# Patient Record
Sex: MALE | Race: WHITE
[De-identification: names, ages, dates, MRNs, and addresses within clinical notes are randomized per-mention and may not be internally consistent; named-entity substitution may affect disease eponyms.]

---

## 2020-08-07 ENCOUNTER — HOSPITAL ENCOUNTER (OUTPATIENT)
Dept: HOSPITAL 74 - JASU-SURG | Age: 50
Discharge: HOME | End: 2020-08-07
Attending: ORTHOPAEDIC SURGERY
Payer: COMMERCIAL

## 2020-08-07 VITALS — HEART RATE: 84 BPM | DIASTOLIC BLOOD PRESSURE: 96 MMHG | TEMPERATURE: 98 F | SYSTOLIC BLOOD PRESSURE: 145 MMHG

## 2020-08-07 VITALS — BODY MASS INDEX: 29 KG/M2

## 2020-08-07 DIAGNOSIS — M65.9: ICD-10-CM

## 2020-08-07 DIAGNOSIS — S83.242A: Primary | ICD-10-CM
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
## 2020-08-07 DIAGNOSIS — M22.42: ICD-10-CM

## 2020-08-07 PROCEDURE — 29881 ARTHRS KNE SRG MNISECTMY M/L: CPT

## 2020-08-07 PROCEDURE — 0SBD4ZZ EXCISION OF LEFT KNEE JOINT, PERCUTANEOUS ENDOSCOPIC APPROACH: ICD-10-PCS | Performed by: ORTHOPAEDIC SURGERY

## 2020-08-07 PROCEDURE — 0SQD4ZZ REPAIR LEFT KNEE JOINT, PERCUTANEOUS ENDOSCOPIC APPROACH: ICD-10-PCS | Performed by: ORTHOPAEDIC SURGERY

## 2020-08-07 NOTE — OPR
Procedure: Left knee-


1.  Diagnostic arthroscopy


2.  Arthroscopic partial medial meniscectomy


3.  Arthroscopic knee synovectomy, limited


4.  Chondroplasty of the patella, trochlea, and medial femoral condyle





Preoperative Diagnoses: Left knee-


1.  Medial meniscus tear


2.  Patellar chondromalacia


3.  Synovitis





Postoperative Diagnoses: Left knee-


1.  Medial meniscus tear


2.  Grade II and III chondromalacia patella


3.  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 


4.  Grade II and III chondromalacia medial femoral condyle, 6 x 6 mm


5.  Synovitis and medial plica





Surgeon: Darío Patrick DO





Assistants: Sea Ross DO





Anesthesia: General anesthesia





Estimated Blood Loss:  Minimal


       


Drains: None


       


Total IV Fluids: Per anesthesia record


       


Specimens: None


       


Implants: None


       


Complications:  None


       


Disposition: PACU


       


Condition: Hemodynamically stable





Tourniquet time: Not inflated





Indications: Ralph Griffin presented to us with chronic left knee pain that 

failed conservative measures. His symptoms, signs, and imaging were consistent 

with the above noted diagnoses. He ultimately elected to proceed with surgical 

intervention after discussion of the risks, benefits, alternatives. We discussed

risks including but not limited to, bleeding, pain, infection, scarring, damage 

to neurovascular structures, blood clots, pulmonary embolus, need for additional

surgery, incomplete relief of pain, and incomplete return of function. He 

expressed understanding and wished to proceed.


 


He underwent preoperative medical evaluation clearance and optimization prior to

surgery.





Procedure Details: 


On the day of surgery, the patient was seen and identified in the preoperative 

holding area, and the operative site was confirmed with the patient and marked. 

The risks and benefits of surgery were again reviewed with the patient, and he 

elected to proceed.





The patient was brought back to the operating room and positioned on the 

operating room table in a supine position, with care to pad all bony prominences

and in neutral alignment. SCDs were placed over the contralateral extremity. A 

nonsterile tourniquet was placed over the proximal thigh, and the left knee was 

then prepped and draped in the usual sterile fashion.





A timeout was performed, confirming the correct operative site, as well as the 

administration of IV antibiotics and the availability of all necessary personnel

and equipment.





Examination under anesthesia showed full range of motion, stable to AP, varus 

and valgus stresses. 





10cc Of 0.25% marcaine were used to infiltrate the portal sites, and through a 

lateral portal, a diagnostic arthroscopy ensued.





This showed mild to moderate inflammed synovitis. The patella was centered in 

the trochlear groove. There was grade II and III chondromalacia of the patella 

and a grade II and III chondromalacia of the central trochlea measuring 3 x 6 

mm. The trochlea showed no significant dysplasia. The medial compartment showed 

a Grade II and III chondromalacia on the medial femoral condyle measuring 6 x 6 

mm. There were no chondral defects on the medial tibial plateau. The medial 

meniscus was probed an found to have radial tear at the posterior horn-body 

junction, which was partially resected and debrided to a stable base.  A 

chondroplasty was performed with a shaver at the medial femoral condyle, 

patella, and trochlea until there was a stable base. A large medial plica was 

noted, causing impingement in the patellofemoral joint, and a two compartment 

synovectomy was performed at this time. 





The ACL and PCL were probed and found to be intact.





The lateral femoral condyle and lateral tibial plateau showed no chondral 

defects. The lateral meniscus was probed and found to be stable. 





The medial and lateral gutters showed no loose bodies.





All instruments with and removed, and the knee was thoroughly irrigated.





Portals were closed using 40 Nylon and dressed with 4x4s, ABDs, webril and a 

loosely wrapped ace bandage.





The patient was awakened from anesthesia and transferred to the PACU in stable 

condition. There were no complications.





Attestation for first assistant: Dr. Sea Ross acted as the first assistant. 

There was no qualified resident or physician assistant available to do so.





Postoperative plan: The patient will be weight bearing as tolerated on crutches 

for 3 days. Physical therapy to begin in 2 weeks.

## 2020-08-11 NOTE — PATH
Surgical Pathology Report



Patient Name:  PATRICIA WATSON

Accession #:  [id number]

Med. Rec. #:  [id number]                                                        

   /Age/Gender:  1970 (Age: 50) / M

Account:  [id number]                                                          

             Location[place] [facility] [place] [facility]

Taken:  2020

Received:  8/10/2020

Reported:  2020

Physicians:  Darío Patrick DO

  



Specimen(s) Received

 SHAVINGS 





Clinical History

Left knee medial meniscus tear







Final Diagnosis

KNEE SHAVINGS, LEFT, ARTHROSCOPY:

FRAGMENTS OF SCANT FIBROCONNECTIVE TISSUE, ADIPOSE TISSUE, AND SYNOVIUM.





***Electronically Signed***

Addie Beaulieu M.D.





Gross Description

Received in formalin labeled "left knee shavings," is a 2.5 x 1.3 x 0.3 cm

aggregate of tan-yellow soft tissue fragments. The formalin is filtered and the

specimen is entirely submitted in one cassette.

/8/10/2020



saudi/8/10/2020

## 2020-10-09 NOTE — XMS
Summarization Of Episode

                           Created on:2020



Patient:PATRICIA WATSON

Sex:Male

:1970

External Reference #:[id number]





Demographics







                          Address                   [address]



                                                    [place] [address]

 

                          Home Phone                [phone]

 

                          Work Phone                [phone]

 

                          Preferred Language        en

 

                          Marital Status            Not  or 

 

                          Gnosticism Affiliation     [place]

 

                          Race                      [facility]

 

                          Ethnic Group              Not  or 









Author







                          Organization              [facility]









Support







                Name            Relationship    Address         Phone

 

                MANDARIN ORIENTAL Unavailable     [address] [phone]



                                                [place] [address] 

 

                LIAM WATSON  UNCLE           [address] [phone]



                                                [place] [address] 

 

                NA              Unavailable     Unavailable     Unavailable

 

                TIFFANIE WATSON    SP              [address] [phone]

[phone]



                                                [place] [address] 









Care Team Providers







                    Name                Role                Phone

 

                    RUBENS OSORIO NHDEVON IRELAND Unavailable         Unavailable

 

                    ERMIAS, IVORY DO     Unavailable         Unavailable

 

                    ERMIAS, IVORY DO     Unavailable         Unavailable

 

                    ERMIAS, IVORY DO     Unavailable         Unavailable

 

                    ERMIAS, IVORY DO     Unavailable         Unavailable









Re-disclosure Warning

The records that you are about to access may contain information from federally-
assisted alcohol or drug abuse programs. If such information is present, then 
the following federally mandated warning applies: This information has been 
disclosed to you from records protected by federal confidentiality rules (42 CFR
part 2). The federal rules prohibit you from making any further disclosure of 
this information unless further disclosure is expressly permitted by the written
consent of the person to whom it pertains or as otherwise permitted by 42 CFR 
part 2. A general authorization for the release of medical or other information 
is NOT sufficient for this purpose. The Federal rules restrict any use of the 
information to criminally investigate or prosecute any alcohol or drug abuse 
patient.The records that you are about to access may contain highly sensitive 
health information, the redisclosure of which is protected by Article 27-F of 
the [place] Public Health law. If you continue you may haveaccess to 
information: Regarding HIV / AIDS; Provided by facilities licensed or operated 
by the [facility] Office of Mental Health; or Provided by the [facility]
Office for People With Developmental Disabilities. If such information is 
present, then the following [place] mandated warning applies: This 
information has been disclosed to you from confidential records which are 
protected by state law. State law prohibits you from making any further 
disclosure of this information without the specific written consent of the 
person to whom it pertains, or as otherwise permitted by law. Any unauthorized 
further disclosure in violation of state law may result in a fine or detention 
sentence or both. A general authorization for the release of medical or other 
information is NOT sufficient authorization for further disclosure.



Encounters







           Encounter  Providers  Location   Date       Indications Data Source(s

)

 

           Outpatient Attender: IVORY PUTNAM          2020            [facility]



                      ERMIAS DOAdmitter:            11:55:00 AM            Medic

al Center



                      [facility]            EDT                   



                      DOReferrer: IVORY                                  



                      ERMIAS DO                                   

 

                      Attender: IVORY            2020            [facility]

[facility]



                      ERMIAS DO             11:55:00 AM            [facility]

al



                                            EDT -                 Center)



                                            2020            



                                            11:55:00 AM            



                                            EDT                   

 

           Outpatient Attender: DEVON PUTNAM          2019            [facility]

[facility]



                      RUBENS OSORIO            01:34:00 PM            Medical Cent

er



                      NHUTAdmitter: DEVON OSORIO                                  



                      NHUTReferrer: DEVON OSORIO NHUT                                  







Insurance Providers







          Payer name Policy type Policy ID Covered   Covered party's Policy    P

inés



                    / Coverage           party ID  relationship to Saldana    Inf

ormation



                    type                          saldana              

 

          [employer] HMO           [id number]           SP                  [id number]

5

 

          [employer] [facility]           [id number]             self                [id number]

 

                                                                      

 

                    O                                                 

 

          [employer] [facility] O         [id number]           01                  [id number]

05







Problems, Conditions, and Diagnoses







           Code       Display Name Description Problem Type Effective Dates Data

 Source(s)

 

           M79.603    Pain in arm, PAIN IN ARM, Diagnosis  2020 [facility]

[facility]



                      unspecified UNSPECIFIED            11:55:00 AM EDT Medical

 Center

 

           M25.569    Pain in    PAIN IN    Diagnosis  2020 [facility]



                      unspecified knee UNSPECIFIED KNEE            11:55:00 AM E

[facility]

 

           M79.89     Other specified OTHER SPECIFIED Diagnosis  2019 [facility]



                      soft tissue SOFT TISSUE            01:34:00 PM [facility]



                      disorders  DISORDERS                        







Results







                    ID                  Date                Data Source

 

                    [id number]         2020 10:10:00 AM EDT [facility]











          Name      Value     Range     Interpretation Description Data      Sup

porting



                                        Code                Source(s) Document(s

)

 

          SARS                                              [facility]   



          coronavirus 2                                                   



          RNA                                                         









                                        This lab was ordered by NILA ALEJANDRARH and reported by [facility].











                    ID                  Date                Data Source

 

                    [id number]        2020 02:04:00 PM EDT [facility]











          Name      Value     Range     Interpretation Code Description Data Francisca

rce(s) Supporting



                                                                      Document(s

)

 

          SARS-CoV-2                                         [facility] 



          RNA [facility] 



          Ql                                                          



          TIANNA+probe                                                   









                                        This lab was ordered by [facility] a

nd reported by [facility].









                                        Procedure

## 2020-10-16 ENCOUNTER — HOSPITAL ENCOUNTER (OUTPATIENT)
Dept: HOSPITAL 74 - FASU | Age: 50
Discharge: HOME | End: 2020-10-16
Attending: ORTHOPAEDIC SURGERY
Payer: COMMERCIAL

## 2020-10-16 VITALS — TEMPERATURE: 97.8 F | HEART RATE: 65 BPM

## 2020-10-16 VITALS — SYSTOLIC BLOOD PRESSURE: 131 MMHG | DIASTOLIC BLOOD PRESSURE: 68 MMHG

## 2020-10-16 VITALS — BODY MASS INDEX: 29 KG/M2
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
## 2020-10-16 DIAGNOSIS — M94.261: ICD-10-CM

## 2020-10-16 DIAGNOSIS — M65.861: ICD-10-CM
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
## 2020-10-16 DIAGNOSIS — S83.241A: Primary | ICD-10-CM

## 2020-10-16 DIAGNOSIS — M65.161: ICD-10-CM

## 2020-10-16 PROCEDURE — 0SBC4ZZ EXCISION OF RIGHT KNEE JOINT, PERCUTANEOUS ENDOSCOPIC APPROACH: ICD-10-PCS | Performed by: ORTHOPAEDIC SURGERY

## 2020-10-16 PROCEDURE — 29876 ARTHRS KNEE SURG SYNVCT MAJ: CPT

## 2020-10-16 PROCEDURE — 29881 ARTHRS KNE SRG MNISECTMY M/L: CPT

## 2020-10-16 NOTE — XMS
Summarization Of Episode

                           Created on:2020



Patient:PATRICIA WATSON

Sex:Male

:1970

External Reference #:[id number]





Demographics







                          Address                   [address]



                                                    [place] [address]

 

                          Home Phone                [phone]

 

                          Work Phone                [phone]

 

                          Preferred Language        en

 

                          Marital Status            Not  or 

 

                          Judaism Affiliation     [place]

 

                          Race                      [facility]

 

                          Ethnic Group              Not  or 









Author







                          Organization              [facility]









Support







                Name            Relationship    Address         Phone

 

                MANDARIN ORIENTAL Unavailable     [address] [phone]



                                                [place] [address] 

 

                LIAM WATSON  UNCLE           [address] [phone]



                                                [place] [address] 

 

                NA              Unavailable     Unavailable     Unavailable

 

                TIFFANIE WATSON    SP              [address] [phone]

[phone]



                                                [place] [address] 









Care Team Providers







                    Name                Role                Phone

 

                    RUBENS OSORIO NHDEVON IRELAND Unavailable         Unavailable

 

                    ERMIAS, IVORY DO     Unavailable         Unavailable

 

                    ERMIAS, IVORY DO     Unavailable         Unavailable

 

                    ERMIAS, IVORY DO     Unavailable         Unavailable

 

                    ERMIAS, IVORY DO     Unavailable         Unavailable









Re-disclosure Warning

The records that you are about to access may contain information from federally-
assisted alcohol or drug abuse programs. If such information is present, then 
the following federally mandated warning applies: This information has been 
disclosed to you from records protected by federal confidentiality rules (42 CFR
part 2). The federal rules prohibit you from making any further disclosure of 
this information unless further disclosure is expressly permitted by the written
consent of the person to whom it pertains or as otherwise permitted by 42 CFR 
part 2. A general authorization for the release of medical or other information 
is NOT sufficient for this purpose. The Federal rules restrict any use of the 
information to criminally investigate or prosecute any alcohol or drug abuse 
patient.The records that you are about to access may contain highly sensitive 
health information, the redisclosure of which is protected by Article 27-F of 
the [place] Public Health law. If you continue you may haveaccess to 
information: Regarding HIV / AIDS; Provided by facilities licensed or operated 
by the [facility] Office of Mental Health; or Provided by the [facility]
Office for People With Developmental Disabilities. If such information is 
present, then the following [place] mandated warning applies: This 
information has been disclosed to you from confidential records which are 
protected by state law. State law prohibits you from making any further 
disclosure of this information without the specific written consent of the 
person to whom it pertains, or as otherwise permitted by law. Any unauthorized 
further disclosure in violation of state law may result in a fine or [facility] 
sentence or both. A general authorization for the release of medical or other 
information is NOT sufficient authorization for further disclosure.



Encounters







           Encounter  Providers  Location   Date       Indications Data Source(s

)

 

           Outpatient Attender: IVORY PUTNAM          2020            [facility]



                      ERMIAS DOAdmitter:            11:55:00 AM            Medic

al Center



                      [facility]            EDT                   



                      DOReferrer: IVORY                                  



                      ERMIAS DO                                   

 

                      Attender: IVORY            2020            [facility]

[facility]



                      ERMIAS DO             11:55:00 AM            [facility]

al



                                            EDT -                 Center)



                                            2020            



                                            11:55:00 AM            



                                            EDT                   

 

           Outpatient Attender: DEVON PUTNAM          2019            [facility]

[facility]



                      RUBENS OSORIO            01:34:00 PM            Medical Cent

er



                      NHUTAdmitter: DEVON OSORIO                                  



                      NHUTReferrer: DEVON OSORIO NHUT                                  







Insurance Providers







          Payer name Policy type Policy ID Covered   Covered party's Policy    P

inés



                    / Coverage           party ID  relationship to Saldana    Inf

ormation



                    type                          saldana              

 

          [employer] HMO           [id number]           SP                  [id number]

5

 

          AETNA [facility]           [id number]             self                [id number]

 

                                                                      

 

                    O                                                 

 

          AETNA [facility] O         [id number]           01                  [id number]

05







Problems, Conditions, and Diagnoses







           Code       Display Name Description Problem Type Effective Dates Data

 Source(s)

 

           M79.603    Pain in arm, PAIN IN ARM, Diagnosis  2020 [facility]

[facility]



                      unspecified UNSPECIFIED            11:55:00 AM EDT Medical

 Center

 

           M25.569    Pain in    PAIN IN    Diagnosis  2020 [facility]



                      unspecified knee UNSPECIFIED KNEE            11:55:00 AM E

[facility]

 

           M79.89     Other specified OTHER SPECIFIED Diagnosis  2019 [facility]

antonella [facility]



                      soft tissue SOFT TISSUE            01:34:00 PM [facility]



                      disorders  DISORDERS                        







Results







                    ID                  Date                Data Source

 

                    [id number]         10/12/2020 02:31:00 PM EDT [facility]











          Name      Value     Range     Interpretation Description Data      Sup

porting



                                        Code                Source(s) Document(s

)

 

          SARS                                              [facility]   



          coronavirus 2                                                   



          RNA                                                         









                                        This lab was ordered by NILA MCNAMARA and reported by [facility].











                    ID                  Date                Data Source

 

                    [id number]         2020 10:10:00 AM EDT [facility]











          Name      Value     Range     Interpretation Description Data      Sup

porting



                                        Code                Source(s) Document(s

)

 

          SARS                                              [facility]   



          coronavirus 2                                                   



          RNA                                                         









                                        This lab was ordered by NILA MCNAMARA and reported by [facility].











                    ID                  Date                Data Source

 

                    [id number]        2020 02:04:00 PM EDT [facility]











          Name      Value     Range     Interpretation Code Description Data Francisca

rce(s) Supporting



                                                                      Document(s

)

 

          SARS-CoV-2                                         [facility] 



          RNA [facility] 



          Ql                                                          



          TIANNA+probe                                                   









                                        This lab was ordered by [facility]

nd reported by [facility].









                                        Procedure

## 2020-10-16 NOTE — OPR
Procedure: Right knee-


1.  Diagnostic arthroscopy.


2.  Arthroscopic partial medial meniscectomy.


3.  Arthroscopic knee partial synovectomy.


4.  Chondroplasty of the patella, trochlea, medial femoral condyle, and lateral 

tibial plateau.





Preoperative Diagnoses: Right knee-


1.  Medial meniscus tear.


2.  Chondromalacia.


3.  Synovitis.





Postoperative Diagnoses: Right knee-


1.  Medial meniscus tear.


2.  Grade II and III chondromalacia patella.


3.  Grade II and III chondromalacia central and lateral trochlea. 


4.  Grade II and III chondromalacia medial femoral condyle


5.  Grade II chondromalacia lateral tibial plateau


6.  Synovitis 





Surgeon: Darío Patrick DO





Assistants: Sea Ross DO





Anesthesia: General anesthesia





Estimated Blood Loss:  Minimal


       


Drains: None


       


Total IV Fluids: Per anesthesia record


       


Specimens: None


       


Implants: None


       


Complications:  None


       


Disposition: PACU


       


Condition: Hemodynamically stable





Tourniquet time: Not inflated





Indications: Ralph Griffin presented to us with chronic right knee pain that 

failed conservative measures. His symptoms, signs, and imaging were consistent 

with the above noted diagnoses. He ultimately elected to proceed with surgical 

intervention after discussion of the risks, benefits, alternatives. We discussed

risks including but not limited to, bleeding, pain, infection, scarring, damage 

to neurovascular structures, blood clots, pulmonary embolus, need for additional

surgery, incomplete relief of pain, and incomplete return of function. He 

expressed understanding and wished to proceed.


 


He underwent preoperative medical evaluation clearance and optimization prior to

surgery.





Procedure Details: 


On the day of surgery, he was seen and identified in the preoperative holding 

area, and the operative site was confirmed with the patient and marked. The 

risks and benefits of surgery were again reviewed with the patient, and he 

elected to proceed.





The patient was brought back to the operating room and positioned on the 

operating room table in a supine position, with care to pad all bony prominences

and in neutral alignment. SCDs were placed over the contralateral extremity. A 

nonsterile tourniquet was placed over the right proximal thigh, and the right 

knee was then prepped and draped in the usual sterile fashion.





A timeout was performed, confirming the correct operative site, as well as the 

administration of IV antibiotics and the availability of all necessary personnel

and equipment.





Examination under anesthesia showed full range of motion, stable to AP, varus 

and valgus stresses. 





10cc Of 0.25% marcaine with epinephrine were used to infiltrate the portal 

sites, and through a lateral portal, a diagnostic arthroscopy ensued.





This showed mild to moderate inflammed synovitis. The patella was centered in 

the trochlear groove. There were areas of grade II and III chondromalacia of the

patella, most notable in the lateral facet. There was a moderately sized area of

grade II and III chondromalacia of the lateral and central trochlea. The 

trochlea showed no significant dysplasia. The medial compartment showed a 

moderately sized area of Grade II and III chondromalacia on the weight bearing 

portion of the medial femoral condyle. There were no chondral defects on the 

medial tibial plateau. The medial meniscus was probed an found to have horizont

al tear at the posterior horn-body junction, which was partially resected and 

debrided to a stable base.  A chondroplasty was performed with a shaver at the 

medial femoral condyle, patella, and trochlea until there was a stable base. A 

two compartment synovectomy was performed at this time to debride the inflammed 

synovium. 





The ACL was probed and found to be frayed, but intact. The PCL was probed and 

found to be intact.





The lateral femoral condyle showed no cartilage defects. The lateral tibial 

plateau showed a small area of grade II chondromalacia with no chondral defects.

The lateral meniscus was probed and found to be stable. A gentle chondroplasty 

was performed with a shaver at the lateral tibial plateau. 





The medial and lateral gutters showed no loose bodies.





All instruments with and removed, and the knee was thoroughly irrigated.





Portals were closed using 40 Nylon and dressed with 4x4s, ABDs, webril and a 

loosely wrapped ace bandage.





The patient was awakened from anesthesia and transferred to the PACU in stable 

condition. There were no complications.





Postoperative plan: The patient will be weight bearing as tolerated on crutches 

for 3 days. Physical therapy to begin in 2 weeks.

## 2020-10-21 NOTE — PATH
Surgical Pathology Report



Patient Name:  PATRICIA WATSON

Accession #:  [id number]

Med. Rec. #:  [id number]                                                        

   /Age/Gender:  1970 (Age: 50) / M

Account:  [id number]                                                          

             Location: [facility] AM[facility] 

Taken:  10/16/2020

Received:  10/16/2020

Reported:  10/21/2020

Physicians:  Darío Patrick DO

  



Specimen(s) Received

 RIGHT KNEE SHAVINGS 





Clinical History

Right knee medial meniscus tear, chondromalacia







Final Diagnosis

RIGHT KNEE SHAVINGS:

FRAGMENTS OF CARTILAGE AND SYNOVIAL TISSUE WITH FOCAL FIBROSIS.





***Electronically Signed***

Apolinar Mansfield M.D.





Gross Description

Received in formalin, labeled "right knee shavings," is a 2.5 x 2.2 x 0.3 cm.

aggregate of tan-yellow soft tissue fragments. A representative portion is

submitted in one cassette.

/10/19/2020



saudi/10/19/2020